# Patient Record
Sex: MALE | Race: AMERICAN INDIAN OR ALASKA NATIVE
[De-identification: names, ages, dates, MRNs, and addresses within clinical notes are randomized per-mention and may not be internally consistent; named-entity substitution may affect disease eponyms.]

---

## 2017-11-28 ENCOUNTER — HOSPITAL ENCOUNTER (EMERGENCY)
Dept: HOSPITAL 5 - ED | Age: 22
Discharge: HOME | End: 2017-11-28
Payer: SELF-PAY

## 2017-11-28 VITALS — SYSTOLIC BLOOD PRESSURE: 119 MMHG | DIASTOLIC BLOOD PRESSURE: 68 MMHG

## 2017-11-28 DIAGNOSIS — N30.01: Primary | ICD-10-CM

## 2017-11-28 LAB
BACTERIA #/AREA URNS HPF: (no result) /HPF
BILIRUB UR QL STRIP: (no result)
BLOOD UR QL VISUAL: (no result)
KETONES UR STRIP-MCNC: (no result) MG/DL
LEUKOCYTE ESTERASE UR QL STRIP: (no result)
MUCOUS THREADS #/AREA URNS HPF: (no result) /HPF
NITRITE UR QL STRIP: (no result)
PH UR STRIP: 5 [PH] (ref 5–7)
RBC #/AREA URNS HPF: 23 /HPF (ref 0–6)
UROBILINOGEN UR-MCNC: < 2 MG/DL (ref ?–2)
WBC #/AREA URNS HPF: > 182 /HPF (ref 0–6)

## 2017-11-28 PROCEDURE — 99283 EMERGENCY DEPT VISIT LOW MDM: CPT

## 2017-11-28 PROCEDURE — 81001 URINALYSIS AUTO W/SCOPE: CPT

## 2017-11-28 PROCEDURE — 87086 URINE CULTURE/COLONY COUNT: CPT

## 2017-11-28 PROCEDURE — 87591 N.GONORRHOEAE DNA AMP PROB: CPT

## 2017-11-28 PROCEDURE — 96372 THER/PROPH/DIAG INJ SC/IM: CPT

## 2017-11-28 NOTE — EMERGENCY DEPARTMENT REPORT
ED Male  HPI





- General


Chief complaint: Urogenital-Male


Stated complaint: PENILE DISCHARGE


Time Seen by Provider: 17 12:27


Source: patient


Mode of arrival: Ambulatory


Limitations: No Limitations





- History of Present Illness


Initial comments: 





Patient here complaining in with urinary burning 2 weeks.  He said he also 

have discharge from penis and would like to be checked for STDs.  Denies any 

abdominal or back pain.  Denies any nausea or vomiting.  Denies any fever or 

chills.  No over-the-counter medication taken.


MD Complaint: penile discharge, dysuria


Onset/Timin


-: week(s)


Radiation: none


Severity: mild


Severity scale (0 -10): 2 (burning with urination)


Quality: burning


Consistency: intermittent


Worsens with: urination


new sexual partner


discharge, dysuria.  denies: swelling, mass, rash, urinary retention, blood in 

urine, fever, nausea/vomiting, incontinence





- Related Data


Sexually active: Yes


 Previous Rx's











 Medication  Instructions  Recorded  Last Taken  Type


 


Doxycycline [Vibramycin] 100 mg PO Q12HR #28 capsule 10/20/17 Unknown Rx


 


Ibuprofen [Motrin] 600 mg PO Q8H PRN #30 tablet 10/20/17 Unknown Rx


 


Ondansetron [Zofran Odt] 4 mg PO Q8HR PRN #20 tab.rapdis 10/20/17 Unknown Rx


 


Ciprofloxacin HCl [Ciprofloxacin 500 mg PO Q12H 7 Days #14 tab 17 Unknown 

Rx





TAB]    











 Allergies











Allergy/AdvReac Type Severity Reaction Status Date / Time


 


No Known Allergies Allergy   Verified 17 10:02














ED Review of Systems


ROS: 


Stated complaint: PENILE DISCHARGE


Other details as noted in HPI





Comment: All other systems reviewed and negative


Constitutional: no symptoms reported


Eyes: denies: eye discharge


ENT: denies: ear pain, throat pain, congestion


Respiratory: no symptoms reported


Cardiovascular: denies: chest pain, palpitations, dyspnea on exertion, edema, 

syncope, paroxysmal nocturnal dyspnea


Gastrointestinal: denies: abdominal pain, nausea, vomiting, diarrhea, 

constipation


Genitourinary: dysuria, discharge.  denies: urgency, frequency, hematuria, 

testicular pain, testicular mass


Musculoskeletal: denies: back pain, joint swelling, arthralgia, myalgia


Skin: denies: rash


Neurological: denies: headache, weakness





ED Past Medical Hx





- Past Medical History


Previous Medical History?: No





- Surgical History


Past Surgical History?: No





- Family History


Family history: no significant





- Social History


Smoking Status: Never Smoker


Substance Use Type: None





- Medications


Home Medications: 


 Home Medications











 Medication  Instructions  Recorded  Confirmed  Last Taken  Type


 


Doxycycline [Vibramycin] 100 mg PO Q12HR #28 capsule 10/20/17  Unknown Rx


 


Ibuprofen [Motrin] 600 mg PO Q8H PRN #30 tablet 10/20/17  Unknown Rx


 


Ondansetron [Zofran Odt] 4 mg PO Q8HR PRN #20 tab.rapdis 10/20/17  Unknown Rx


 


Ciprofloxacin HCl [Ciprofloxacin 500 mg PO Q12H 7 Days #14 tab 17  

Unknown Rx





TAB]     














ED Physical Exam





- General


Limitations: No Limitations


General appearance: alert, in no apparent distress





- Head


Head exam: Present: atraumatic, normocephalic, normal inspection





- Eye


Eye exam: Present: normal appearance, PERRL, EOMI


Pupils: Present: normal accommodation





- ENT


ENT exam: Present: normal exam, normal orophraynx, mucous membranes moist, TM's 

normal bilaterally, normal external ear exam





- Neck


Neck exam: Present: normal inspection, full ROM.  Absent: tenderness, 

meningismus, lymphadenopathy





- Respiratory


Respiratory exam: Present: normal lung sounds bilaterally.  Absent: respiratory 

distress, chest wall tenderness, accessory muscle use





- Cardiovascular


Cardiovascular Exam: Present: regular rate, normal rhythm, normal heart sounds.

  Absent: systolic murmur, diastolic murmur





- GI/Abdominal


GI/Abdominal exam: Present: soft, normal bowel sounds.  Absent: distended, 

tenderness, guarding, rebound, rigid





- Extremities Exam


Extremities exam: Present: normal inspection, full ROM, normal capillary refill

, other (no clubbing, cyanosis or edema.  +2 pulses all extremities.  No 

neurovascular compromise).  Absent: tenderness, pedal edema, joint swelling, 

calf tenderness





- Back Exam


Back exam: Present: normal inspection, full ROM.  Absent: tenderness, CVA 

tenderness (R), CVA tenderness (L), muscle spasm, paraspinal tenderness, 

vertebral tenderness, rash noted





- Neurological Exam


Neurological exam: Present: alert, oriented X3, normal gait





- Psychiatric


Psychiatric exam: Present: normal affect, normal mood





- Skin


Skin exam: Present: warm, dry, intact, normal color.  Absent: rash





ED Course


 Vital Signs











  17





  10:02


 


Temperature 98.4 F


 


Pulse Rate 66


 


Respiratory 18





Rate 


 


Blood Pressure 119/68


 


O2 Sat by Pulse 100





Oximetry 














- Reevaluation(s)


Reevaluation #1: 





17 05:12


Patient had uneventful ED stay.  A shin treated with Rocephin 1 g IM for UTI 

and empirically for gonorrhea, Flagyl 2 g for Trichomonas and azithromycin 1 g 

for Chlamydia.  He had no adverse reaction from medications

















ED Medical Decision Making





- Lab Data








 Lab Results











  17 Range/Units





  12:30 


 


Urine Color  Yellow  (Yellow)  


 


Urine Turbidity  Clear  (Clear)  


 


Urine pH  5.0  (5.0-7.0)  


 


Ur Specific Gravity  1.028  (1.003-1.030)  


 


Urine Protein  30 mg/dl  (Negative)  mg/dL


 


Urine Glucose (UA)  Neg  (Negative)  mg/dL


 


Urine Ketones  Tr  (Negative)  mg/dL


 


Urine Blood  Sm  (Negative)  


 


Urine Nitrite  Neg  (Negative)  


 


Urine Bilirubin  Neg  (Negative)  


 


Urine Urobilinogen  < 2.0  (<2.0)  mg/dL


 


Ur Leukocyte Esterase  Lg  (Negative)  


 


Urine WBC (Auto)  > 182.0 H  (0.0-6.0)  /HPF


 


Urine RBC (Auto)  23.0  (0.0-6.0)  /HPF


 


Urine Bacteria (Auto)  1+  (Negative)  /HPF


 


Urine Mucus  1+  /HPF








Urine culture sent and pending





- Medical Decision Making





ED course: Patient here complaining a urinary burning and penile discharge for 

2 weeks.  He reports that he was exposed to partner that told him that she was 

treated for STD.  Patient requested to be treated.  UA revealed patient also 

with urinary tract infection to include greater than 182 white blood cell, 

large amount of leukocyte esterase, positive bacteria, positive protein and 

small amount of blood.  Patient was treated with Rocephin 1 g IM to cover UTI 

and gonorrhea, Flagyl 2 g by mouth to cover Trichomonas and azithromycin 1 g by 

mouth to cover chlamydia.  He had no adverse reaction from medication.  

Gonorrhea and chlamydia tests are pending and urine cultures pending.  Patient 

stable throughout ED course .I discussed with him that he needs to follow-up 

with clinic on health department follow-up visit STD.  Patient also discharged 

home with prescription for ciprofloxacin for UTI and was given instruction to 

also follow up at primary care physician.


Critical care attestation.: 


If time is entered above; I have spent that time in minutes in the direct care 

of this critically ill patient, excluding procedure time.








ED Disposition


Clinical Impression: 


 Concern about STD in male without diagnosis, Penile discharge, Acute cystitis 

with hematuria





Disposition: DC-01 TO HOME OR SELFCARE


Is pt being admited?: No


Does the pt Need Aspirin: No


Condition: Stable


Instructions:  Sexually Transmitted Diseases (ED), Safe Sex (ED), Urinary Tract 

Infection in Men (ED), Dysuria (ED)


Additional Instructions: 


Please refrain from having sexual activity for the next 2 weeks.


Follow-up with your primary care physician in one to 10 days


Follow up with [facility] in 7-10 days for repeat STD 

check


 You were treated today in emergency room for gonorrhea, chlamydia and 

Trichomonias.  The urine was sent for culture, gonorrhea and chlamydia and 

although he retreated results will be back in 5-7 days.  Please return to 

medical records for year identification to get results.


Practice safe sex


do not drink alcohol for the next 7 days as this will interact negatively with 

medication given for STD.


Tell  partner that you're treated for STD and hospital today.


 increase  fluid intake


Take medication as prescribed for urinary tract infection


Prescriptions: 


Ciprofloxacin HCl [Ciprofloxacin TAB] 500 mg PO Q12H 7 Days #14 tab


Referrals: 


[facility] [Outside] - 7-10 days


PRIMARY CARE,MD [Primary Care Provider] - 2-3 Days


Forms:  Work/School Release Form(ED)

## 2018-04-04 ENCOUNTER — HOSPITAL ENCOUNTER (EMERGENCY)
Dept: HOSPITAL 5 - ED | Age: 23
Discharge: HOME | End: 2018-04-04
Payer: SELF-PAY

## 2018-04-04 VITALS — DIASTOLIC BLOOD PRESSURE: 78 MMHG | SYSTOLIC BLOOD PRESSURE: 128 MMHG
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
## 2018-04-04 DIAGNOSIS — N39.0: ICD-10-CM
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
## 2018-04-04 DIAGNOSIS — S39.012A: Primary | ICD-10-CM

## 2018-04-04 DIAGNOSIS — J03.90: ICD-10-CM
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
## 2018-04-04 LAB
BILIRUB UR QL STRIP: (no result)
BLOOD UR QL VISUAL: (no result)
MUCOUS THREADS #/AREA URNS HPF: (no result) /HPF
PH UR STRIP: 5 [PH] (ref 5–7)
RBC #/AREA URNS HPF: 8 /HPF (ref 0–6)
UROBILINOGEN UR-MCNC: < 2 MG/DL (ref ?–2)
WBC #/AREA URNS HPF: 29 /HPF (ref 0–6)

## 2018-04-04 PROCEDURE — 99283 EMERGENCY DEPT VISIT LOW MDM: CPT

## 2018-04-04 PROCEDURE — 81001 URINALYSIS AUTO W/SCOPE: CPT

## 2018-04-04 NOTE — EMERGENCY DEPARTMENT REPORT
ED ENT HPI





- General


Chief complaint: Back Pain/Injury


Stated complaint: BACK PAIN/SORE THROAT


Time Seen by Provider: 04/04/18 16:54


Source: patient


Mode of arrival: Ambulatory


Limitations: No Limitations





- History of Present Illness


Initial comments: 





This is a 22-year-old male nontoxic, well nourished in appearance, no acute 

signs of distress presents to the ED with c/o of sore throat, dysuria, and 

acute on chronic back pain 4 years.  Patient describes sore throat as 

swallowing razer blades.  Patient denies any penile discharge, fever, chills, 

nausea, vomiting, headache, stiff neck, chest pain, short of breath, numbness 

or tingling.  Patient denies any bladder or bowel.  Patient stated that her 

pain is intermittent in and is relieved with supine position and actually by 

movement.  Patient denies any allergies or significant past medical history.


MD complaint: sore throat, other (back pain, dysuria)


-: days(s) (3)


Location: throat


Severity: mild


Severity scale (0 -10): 3


Quality: burning, aching


Consistency: intermittent


Improves with: none


Worsens with: swallowing


Associated Symptoms: pain with swallowing, sore throat.  denies: fever, cough, 

gum swelling, toothache, tinnitus, hearing loss, discharge from ear, rhinorrhea





- Related Data


 Previous Rx's











 Medication  Instructions  Recorded  Last Taken  Type


 


Doxycycline [Vibramycin] 100 mg PO Q12HR #28 capsule 10/20/17 Unknown Rx


 


Ibuprofen [Motrin] 600 mg PO Q8H PRN #30 tablet 10/20/17 Unknown Rx


 


Ondansetron [Zofran Odt] 4 mg PO Q8HR PRN #20 tab.rapdis 10/20/17 Unknown Rx


 


Ciprofloxacin HCl [Ciprofloxacin 500 mg PO Q12H 7 Days #14 tab 11/28/17 Unknown 

Rx





TAB]    


 


Amoxicillin/K Clav Tab [Augmentin 1 tab PO Q12HR #20 tab 04/04/18 Unknown Rx





875 mg]    


 


Cyclobenzaprine [Flexeril] 10 mg PO QHS PRN #7 tablet 04/04/18 Unknown Rx


 


Ibuprofen [Motrin] 600 mg PO Q8H PRN #30 tablet 04/04/18 Unknown Rx











 Allergies











Allergy/AdvReac Type Severity Reaction Status Date / Time


 


No Known Allergies Allergy   Verified 04/04/18 13:05














ED Dental HPI





- General


Chief complaint: Back Pain/Injury


Stated complaint: BACK PAIN/SORE THROAT


Time Seen by Provider: 04/04/18 16:54


Source: patient


Mode of arrival: Ambulatory


Limitations: No Limitations





- Related Data


 Previous Rx's











 Medication  Instructions  Recorded  Last Taken  Type


 


Doxycycline [Vibramycin] 100 mg PO Q12HR #28 capsule 10/20/17 Unknown Rx


 


Ibuprofen [Motrin] 600 mg PO Q8H PRN #30 tablet 10/20/17 Unknown Rx


 


Ondansetron [Zofran Odt] 4 mg PO Q8HR PRN #20 tab.rapdis 10/20/17 Unknown Rx


 


Ciprofloxacin HCl [Ciprofloxacin 500 mg PO Q12H 7 Days #14 tab 11/28/17 Unknown 

Rx





TAB]    


 


Amoxicillin/K Clav Tab [Augmentin 1 tab PO Q12HR #20 tab 04/04/18 Unknown Rx





875 mg]    


 


Cyclobenzaprine [Flexeril] 10 mg PO QHS PRN #7 tablet 04/04/18 Unknown Rx


 


Ibuprofen [Motrin] 600 mg PO Q8H PRN #30 tablet 04/04/18 Unknown Rx











 Allergies











Allergy/AdvReac Type Severity Reaction Status Date / Time


 


No Known Allergies Allergy   Verified 04/04/18 13:05














ED Review of Systems


ROS: 


Stated complaint: BACK PAIN/SORE THROAT


Other details as noted in HPI





Constitutional: denies: chills, fever


Eyes: denies: eye pain, eye discharge, vision change


ENT: throat pain.  denies: ear pain


Respiratory: denies: cough, shortness of breath, wheezing


Cardiovascular: denies: chest pain, palpitations


Endocrine: no symptoms reported


Gastrointestinal: denies: abdominal pain, nausea, diarrhea


Genitourinary: dysuria.  denies: urgency


Musculoskeletal: back pain.  denies: joint swelling, arthralgia


Skin: denies: rash, lesions


Neurological: denies: headache, weakness, paresthesias


Psychiatric: denies: anxiety, depression


Hematological/Lymphatic: denies: easy bleeding, easy bruising





ED Past Medical Hx





- Past Medical History


Previous Medical History?: No





- Surgical History


Past Surgical History?: No





- Social History


Smoking Status: Never Smoker


Substance Use Type: None





- Medications


Home Medications: 


 Home Medications











 Medication  Instructions  Recorded  Confirmed  Last Taken  Type


 


Doxycycline [Vibramycin] 100 mg PO Q12HR #28 capsule 10/20/17  Unknown Rx


 


Ibuprofen [Motrin] 600 mg PO Q8H PRN #30 tablet 10/20/17  Unknown Rx


 


Ondansetron [Zofran Odt] 4 mg PO Q8HR PRN #20 tab.rapdis 10/20/17  Unknown Rx


 


Ciprofloxacin HCl [Ciprofloxacin 500 mg PO Q12H 7 Days #14 tab 11/28/17  

Unknown Rx





TAB]     


 


Amoxicillin/K Clav Tab [Augmentin 1 tab PO Q12HR #20 tab 04/04/18  Unknown Rx





875 mg]     


 


Cyclobenzaprine [Flexeril] 10 mg PO QHS PRN #7 tablet 04/04/18  Unknown Rx


 


Ibuprofen [Motrin] 600 mg PO Q8H PRN #30 tablet 04/04/18  Unknown Rx














ED Physical Exam





- General


Limitations: No Limitations


General appearance: alert, in no apparent distress





- Head


Head exam: Present: atraumatic, normocephalic





- Eye


Eye exam: Present: normal appearance


Pupils: Present: normal accommodation





- ENT


ENT exam: Present: mucous membranes moist, TM's normal bilaterally, normal 

external ear exam





- Expanded ENT Exam


  ** Expanded


Ear exam: Present: normal external inspection


Mouth exam: Present: normal external inspection, tongue normal.  Absent: 

drooling, trismus, muffled voice, tongue elevation, laceration


Teeth exam: Present: normal inspection


Throat exam: Positive: tonsillar erythema, tonsillomegaly (2+), tonsillar 

exudate, other (Uvula midline. No abscess or swelling noted. ).  Negative: R 

peritonsillar mass, L peritonsillar mass





- Neck


Neck exam: Present: normal inspection, full ROM.  Absent: tenderness, 

meningismus





- Respiratory


Respiratory exam: Present: normal lung sounds bilaterally.  Absent: respiratory 

distress, wheezes, rales, rhonchi, stridor, chest wall tenderness, accessory 

muscle use, decreased breath sounds, prolonged expiratory





- Cardiovascular


Cardiovascular Exam: Present: regular rate, normal rhythm, normal heart sounds.

  Absent: bradycardia, tachycardia, irregular rhythm, systolic murmur, 

diastolic murmur, rubs, gallop





- GI/Abdominal


GI/Abdominal exam: Present: soft, normal bowel sounds.  Absent: distended, 

tenderness, guarding, rebound, rigid, diminished bowel sounds





- Rectal


Rectal exam: Present: deferred





- Extremities Exam


Extremities exam: Present: normal inspection, full ROM, normal capillary 

refill.  Absent: tenderness





- Back Exam


Back exam: Present: normal inspection, full ROM, paraspinal tenderness (lumbar 

region).  Absent: tenderness, CVA tenderness (R), CVA tenderness (L), muscle 

spasm, vertebral tenderness, rash noted





- Expanded Back Exam


  ** Expanded


Back exam: Absent: saddle anesthesia


Back exam: Negative Straight Leg Raising: Left, Right





- Neurological Exam


Neurological exam: Present: alert, oriented X3, normal gait





- Psychiatric


Psychiatric exam: Present: normal affect, normal mood





- Skin


Skin exam: Present: warm, dry, intact, normal color.  Absent: rash





ED Course


 Vital Signs











  04/04/18





  13:06


 


Temperature 98.2 F


 


Pulse Rate 71


 


Blood Pressure 123/71


 


O2 Sat by Pulse 99





Oximetry 














- Reevaluation(s)


Reevaluation #1: 





04/04/18 17:51


Patient is speaking in full sentences with no signs of distress noted.





ED Medical Decision Making





- Medical Decision Making





This is a 22-year-old male that presents with UTI, low back strain, and 

tonsillitis.  Patient is stable and was examined by me.  UA indicates UTI. 

Patient is to be treated with Augmentin for UTI and Tonsillitis. Patient also 

received Motrin in the ED which it stated symptoms of back pain resolved and 

improved. Patient is discharged with Flexeril and motrin and was instructed not 

to operate any machinry while taking Fleeril due to drowiens.  Patient was 

instructed to Follow-up with a primary care doctor in 3-5 days or if symptoms 

worsen and continue return to emergency room as soon as possible.  At time of 

discharge, the patient does not seem toxic or ill in appearance.  No acute 

signs of distress noted.  Patient agrees to discharge treatment plan of care.  

No further questions noted by the patient.


Critical care attestation.: 


If time is entered above; I have spent that time in minutes in the direct care 

of this critically ill patient, excluding procedure time.








ED Disposition


Clinical Impression: 


 Tonsillitis





Low back strain


Qualifiers:


 Encounter type: initial encounter Qualified Code(s): S39.012A - Strain of 

muscle, fascia and tendon of lower back, initial encounter





UTI (urinary tract infection)


Qualifiers:


 Urinary tract infection type: site unspecified Hematuria presence: without 

hematuria Qualified Code(s): N39.0 - Urinary tract infection, site not specified





Disposition: DC-01 TO HOME OR SELFCARE


Is pt being admited?: No


Does the pt Need Aspirin: No


Condition: Stable


Instructions:  Low Back Strain (ED), Cyclobenzaprine (By mouth), Ibuprofen (By 

mouth), Tonsillitis (ED), Amoxicillin/Clavulanate Potassium (By mouth), Urinary 

Tract Infection in Men (ED)


Additional Instructions: 


Follow-up with a primary care doctor in 3-5 days or if symptoms worsen and 

continue return to emergency room as soon as possible. 


Take ibuprofen and Flexeril as prescribed.  Do not operate heavy machinery 

while taking Flexeril due to sedation


Prescriptions: 


Cyclobenzaprine [Flexeril] 10 mg PO QHS PRN #7 tablet


 PRN Reason: Muscle Spasm


Amoxicillin/K Clav Tab [Augmentin 875 mg] 1 tab PO Q12HR #20 tab


Ibuprofen [Motrin] 600 mg PO Q8H PRN #30 tablet


 PRN Reason: Pain


Referrals: 


PRIMARY CARE,MD [Primary Care Provider] - 3-5 Days


ISABEL DOCKERY MD [Staff Physician] - 3-5 Days


[facility] [Outside] - 3-5 Days


[facility] [Outside] - 3-5 Days


Forms:  Work/School Release Form(ED)

## 2018-04-18 ENCOUNTER — HOSPITAL ENCOUNTER (EMERGENCY)
Dept: HOSPITAL 5 - ED | Age: 23
LOS: 1 days | Discharge: PSYCHIATRIC OR SUBSTANCE USE FACILITY | End: 2018-04-19
Payer: SELF-PAY

## 2018-04-18 DIAGNOSIS — F17.200: ICD-10-CM

## 2018-04-18 DIAGNOSIS — F32.9: Primary | ICD-10-CM

## 2018-04-18 DIAGNOSIS — F19.10: ICD-10-CM

## 2018-04-18 DIAGNOSIS — F12.10: ICD-10-CM

## 2018-04-18 LAB
BACTERIA #/AREA URNS HPF: (no result) /HPF
BASOPHILS # (AUTO): 0 K/MM3 (ref 0–0.1)
BASOPHILS NFR BLD AUTO: 0.5 % (ref 0–1.8)
BENZODIAZEPINES SCREEN,URINE: (no result)
BILIRUB UR QL STRIP: (no result)
BLOOD UR QL VISUAL: (no result)
BUN SERPL-MCNC: 9 MG/DL (ref 9–20)
BUN/CREAT SERPL: 15 %
CALCIUM SERPL-MCNC: 9.6 MG/DL (ref 8.4–10.2)
EOSINOPHIL # BLD AUTO: 0 K/MM3 (ref 0–0.4)
EOSINOPHIL NFR BLD AUTO: 0.2 % (ref 0–4.3)
HCT VFR BLD CALC: 41.3 % (ref 35.5–45.6)
HEMOLYSIS INDEX: 2
HGB BLD-MCNC: 14.2 GM/DL (ref 11.8–15.2)
LYMPHOCYTES # BLD AUTO: 1.5 K/MM3 (ref 1.2–5.4)
LYMPHOCYTES NFR BLD AUTO: 25.3 % (ref 13.4–35)
MCH RBC QN AUTO: 29 PG (ref 28–32)
MCHC RBC AUTO-ENTMCNC: 34 % (ref 32–34)
MCV RBC AUTO: 83 FL (ref 84–94)
METHADONE SCREEN,URINE: (no result)
MONOCYTES # (AUTO): 0.4 K/MM3 (ref 0–0.8)
MONOCYTES % (AUTO): 6.6 % (ref 0–7.3)
MUCOUS THREADS #/AREA URNS HPF: (no result) /HPF
OPIATE SCREEN,URINE: (no result)
PH UR STRIP: 5 [PH] (ref 5–7)
PLATELET # BLD: 329 K/MM3 (ref 140–440)
RBC # BLD AUTO: 4.97 M/MM3 (ref 3.65–5.03)
RBC #/AREA URNS HPF: 12 /HPF (ref 0–6)
UROBILINOGEN UR-MCNC: 2 MG/DL (ref ?–2)
WBC #/AREA URNS HPF: 72 /HPF (ref 0–6)

## 2018-04-18 PROCEDURE — 80048 BASIC METABOLIC PNL TOTAL CA: CPT

## 2018-04-18 PROCEDURE — 80307 DRUG TEST PRSMV CHEM ANLYZR: CPT

## 2018-04-18 PROCEDURE — 85025 COMPLETE CBC W/AUTO DIFF WBC: CPT

## 2018-04-18 PROCEDURE — 36415 COLL VENOUS BLD VENIPUNCTURE: CPT

## 2018-04-18 PROCEDURE — 80320 DRUG SCREEN QUANTALCOHOLS: CPT

## 2018-04-18 PROCEDURE — 99285 EMERGENCY DEPT VISIT HI MDM: CPT

## 2018-04-18 PROCEDURE — 81001 URINALYSIS AUTO W/SCOPE: CPT

## 2018-04-18 PROCEDURE — G0480 DRUG TEST DEF 1-7 CLASSES: HCPCS

## 2018-04-19 VITALS — SYSTOLIC BLOOD PRESSURE: 107 MMHG | DIASTOLIC BLOOD PRESSURE: 44 MMHG
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
## 2018-04-19 NOTE — CONSULTATION
History of Present Illness





- Reason for Consult


Consult date: 04/19/18


Reason for consult: Mental Health Evaluation


Requesting physician: LATHA WELCH





- Chief Complaint


Chief complaint: 


"I have to do better"








- History of Present Psychiatric Illness


23 y/o AA male presenting to [facility] for SI/HI's. Today the patient is calm and 

cooperative during the assessment. He stated having a lot of life stressors 

currently (financial issues, co-parenting, and no vehicle). He stated feeling 

hopeless and helpless about his issues. He think no one cares about him, but 

did admit that his actions recently have not been rationale. He stated that he 

is having problems with his children's mother. He denies wanting to kill anyone 

when asked. He stated that he want to attend [employer] and get his life 

together because he cannot continue to live this way. He acknowledged suicidal 

thoughts recently. He denies SI/HI's and AVH's. He admitted to erratic sleep 

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 He acknowledged marijuana use socially, 

but denies alcohol consumption (etoh). The patient prefer to talk with a 

therapist instead of taking medication.   








Medications and Allergies


 Allergies











Allergy/AdvReac Type Severity Reaction Status Date / Time


 


No Known Allergies Allergy   Verified 04/04/18 13:05











 Home Medications











 Medication  Instructions  Recorded  Confirmed  Last Taken  Type


 


Doxycycline [Vibramycin] 100 mg PO Q12HR #28 capsule 10/20/17 04/18/18 Unknown 

Rx


 


Ibuprofen [Motrin] 600 mg PO Q8H PRN #30 tablet 10/20/17 04/18/18 Unknown Rx


 


Ondansetron [Zofran Odt] 4 mg PO Q8HR PRN #20 tab.rapdis 10/20/17 04/18/18 

Unknown Rx


 


Ciprofloxacin HCl [Ciprofloxacin 500 mg PO Q12H 7 Days #14 tab 11/28/17 04/18/ 18 Unknown Rx





TAB]     


 


Amoxicillin/K Clav Tab [Augmentin 1 tab PO Q12HR #20 tab 04/04/18 04/18/18 

Unknown Rx





875 mg]     


 


Cyclobenzaprine [Flexeril] 10 mg PO QHS PRN #7 tablet 04/04/18 04/18/18 Unknown 

Rx


 


Ibuprofen [Motrin] 600 mg PO Q8H PRN #30 tablet 04/04/18 04/18/18 Unknown Rx














Mental Status Exam





- Vital signs


 Last Vital Signs











Temp  98.1 F   04/19/18 09:48


 


Pulse  81   04/19/18 09:48


 


Resp  19   04/19/18 09:49


 


BP  115/69   04/19/18 09:48


 


Pulse Ox  100   04/19/18 09:49














- Exam


Narrative exam: 


MSE:





 Appearance: calm, cooperative


 Behavior: regular eye contact 


 Speech: regular rate and tone


 Mood: "okay" withdrawn


 Affect: congruent to mood


 Thought Process: circumstantial  


 Thought Content: denies SI/HI's and AVH's


 Motor Activity: ambulatory


 Cognition: A/O x 3


 Insight: variable 


 Judgment: variable 








Results


Result Diagrams: 


 04/18/18 17:22





 04/18/18 17:22


 Abnormal lab results











  04/18/18 04/18/18 04/18/18 Range/Units





  17:16 17:22 17:22 


 


MCV     (84-94)  fl


 


Creatinine     (0.8-1.5)  mg/dL


 


Glucose     ()  mg/dL


 


Urine WBC (Auto)  72.0 H    (0.0-6.0)  /HPF


 


Salicylates   < 0.3 L   (2.8-20.0)  mg/dL


 


Acetaminophen    < 5.0 L  (10.0-30.0)  ug/mL














  04/18/18 04/18/18 Range/Units





  17:22 17:22 


 


MCV   83 L  (84-94)  fl


 


Creatinine  0.6 L   (0.8-1.5)  mg/dL


 


Glucose  104 H   ()  mg/dL


 


Urine WBC (Auto)    (0.0-6.0)  /HPF


 


Salicylates    (2.8-20.0)  mg/dL


 


Acetaminophen    (10.0-30.0)  ug/mL








All other labs normal.








Assessment and Plan


Assessment and plan: 


Impression: MDD, Severe Type. Today the patient is calm and cooperative during 

the assessment.





DDx: R/O Bipolar DO





Recommendation/Plan: Continue 1013 and gather collateral information to 

determine proper dispo. Discussed risk/benefits of antidepressants with 

patient. The patient prefer therapy at this time. Discussed generalized coping 

skills with patient.

## 2021-06-09 ENCOUNTER — HOSPITAL ENCOUNTER (EMERGENCY)
Dept: HOSPITAL 5 - ED | Age: 26
Discharge: HOME | End: 2021-06-09
Payer: SELF-PAY

## 2021-06-09 VITALS — SYSTOLIC BLOOD PRESSURE: 120 MMHG | DIASTOLIC BLOOD PRESSURE: 71 MMHG
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
## 2021-06-09 DIAGNOSIS — S01.01XD: Primary | ICD-10-CM

## 2021-06-09 DIAGNOSIS — Z79.899: ICD-10-CM

## 2021-06-09 DIAGNOSIS — F32.9: ICD-10-CM

## 2021-06-09 NOTE — EMERGENCY DEPARTMENT REPORT
ED General Adult HPI





- General


Chief complaint: Laceration/Recheck/Suture


Stated complaint: SUTURE REMOVAL


Time Seen by Provider: 06/09/21 12:33


Source: patient


Mode of arrival: Ambulatory


Limitations: No Limitations





- History of Present Illness


Initial comments: 





25-year-old -American male patient presents for suture removal today.  

Patient states he had the sutures placed at [facility] 8 days ago. 

He denies any pain, swelling, drainage, redness, or fever/chills/sweats.  

Patient states there are 6 sutures in place.


-: Sudden





- Related Data


                                  Previous Rx's











 Medication  Instructions  Recorded  Last Taken  Type


 


DOXYCYCLINE Hyclate [Vibramycin] 100 mg PO Q12HR #28 capsule 10/20/17 Unknown Rx


 


Ibuprofen [Motrin] 600 mg PO Q8H PRN #30 tablet 10/20/17 Unknown Rx


 


Ondansetron [Zofran Odt] 4 mg PO Q8HR PRN #20 tab.rapdis 10/20/17 Unknown Rx


 


Ciprofloxacin HCl [Ciprofloxacin 500 mg PO Q12H 7 Days #14 tab 11/28/17 Unknown 

Rx





TAB]    


 


Amoxicillin/K Clav Tab [Augmentin 1 tab PO Q12HR #20 tab 04/04/18 Unknown Rx





875 mg]    


 


Cyclobenzaprine [Flexeril] 10 mg PO QHS PRN #7 tablet 04/04/18 Unknown Rx


 


Ibuprofen [Motrin] 600 mg PO Q8H PRN #30 tablet 04/04/18 Unknown Rx











                                    Allergies











Allergy/AdvReac Type Severity Reaction Status Date / Time


 


No Known Allergies Allergy   Verified 04/04/18 13:05














ED Review of Systems


ROS: 


Stated complaint: SUTURE REMOVAL


Other details as noted in HPI





Constitutional: denies: fever, malaise


Skin: denies: rash, lesions, change in color





ED Past Medical Hx





- Past Medical History


Hx Psychiatric Treatment: Yes (depression, polysubstance abuse)





- Surgical History


Past Surgical History?: No





- Social History


Smoking Status: Never Smoker





- Medications


Home Medications: 


                                Home Medications











 Medication  Instructions  Recorded  Confirmed  Last Taken  Type


 


DOXYCYCLINE Hyclate [Vibramycin] 100 mg PO Q12HR #28 capsule 10/20/17 04/18/18 

Unknown Rx


 


Ibuprofen [Motrin] 600 mg PO Q8H PRN #30 tablet 10/20/17 04/18/18 Unknown Rx


 


Ondansetron [Zofran Odt] 4 mg PO Q8HR PRN #20 tab.rapdis 10/20/17 04/18/18 

Unknown Rx


 


Ciprofloxacin HCl [Ciprofloxacin 500 mg PO Q12H 7 Days #14 tab 11/28/17 04/18/18

 Unknown Rx





TAB]     


 


Amoxicillin/K Clav Tab [Augmentin 1 tab PO Q12HR #20 tab 04/04/18 04/18/18 

Unknown Rx





875 mg]     


 


Cyclobenzaprine [Flexeril] 10 mg PO QHS PRN #7 tablet 04/04/18 04/18/18 Unknown 

Rx


 


Ibuprofen [Motrin] 600 mg PO Q8H PRN #30 tablet 04/04/18 04/18/18 Unknown Rx














ED Physical Exam





- General


Limitations: No Limitations


General appearance: alert, in no apparent distress





- Head


Head exam: Present: normocephalic, other (Approximately 3 to 4 cm laceration 

noted to)





- Respiratory


Respiratory exam: Absent: respiratory distress





- Cardiovascular


Cardiovascular Exam: Present: regular rate, normal rhythm





- Neurological Exam


Neurological exam: Present: alert, oriented X3, normal gait





- Psychiatric


Psychiatric exam: Present: normal affect, normal mood





- Skin


Skin exam: Present: warm, dry, intact, normal color.  Absent: rash





ED Course


                                   Vital Signs











  06/09/21





  12:27


 


Temperature 98.3 F


 


Pulse Rate 54 L


 


Respiratory 18





Rate 


 


Blood Pressure 120/71


 


O2 Sat by Pulse 100





Oximetry 














- Procedure Description


Procedures done: 6 simple sutures removed face.  Patient tolerated procedure 

well without any immediate complications.  No bleeding, wound dehiscence, or 

erythema noted





ED Medical Decision Making





- Medical Decision Making





Sutures removed without any immediate complications.  Patient tolerated 

procedure well.  Discussed continued wound care and signs and symptoms that 

should prompt immediate return to the emergency department in detail with 

patient verbalized understanding.  Patient is well-appearing and stable for [facility].  He is to follow-up with his primary care provider.


Critical care attestation.: 


If time is entered above; I have spent that time in minutes in the direct care 

of this critically ill patient, excluding procedure time.








ED Disposition


Clinical Impression: 


 Visit for suture removal





Disposition: DC-01 TO HOME OR SELFCARE


Is pt being admited?: No


Condition: Stable


Instructions:  Wound Closure Removal, Care After


Additional Instructions: 


Neosporin


Moderna 


Referrals: 


[facility] [Provider Group] - 3-5 Days

## 2021-07-28 ENCOUNTER — HOSPITAL ENCOUNTER (EMERGENCY)
Dept: HOSPITAL 5 - ED | Age: 26
Discharge: HOME | End: 2021-07-28
Payer: SELF-PAY

## 2021-07-28 VITALS — DIASTOLIC BLOOD PRESSURE: 72 MMHG | SYSTOLIC BLOOD PRESSURE: 120 MMHG

## 2021-07-28 DIAGNOSIS — Z79.899: ICD-10-CM

## 2021-07-28 DIAGNOSIS — J02.9: Primary | ICD-10-CM

## 2021-07-28 DIAGNOSIS — F32.9: ICD-10-CM

## 2021-07-28 PROCEDURE — 87430 STREP A AG IA: CPT

## 2021-07-28 PROCEDURE — 87116 MYCOBACTERIA CULTURE: CPT

## 2021-07-28 NOTE — EMERGENCY DEPARTMENT REPORT
ED ENT HPI





- General


Chief complaint: Dental/Oral


Stated complaint: SORE THROAT


Time Seen by Provider: 07/28/21 11:12


Source: patient


Mode of arrival: Ambulatory


Limitations: No Limitations





- History of Present Illness


Initial comments: 





Patient is a 25-year-old male presents emergency room complaints of a sore 

throat that began approximately 6 days ago. He states he has some discomfort 

with swallowing. He denies any fever, chills, nausea, vomiting, diarrhea, 

shortness of breath, cough, chest pain. No past medical history. No allergies to

medications. He states that his kids do have "cold-like" symptoms. He denies any

recent travel.





- Related Data


                                  Previous Rx's











 Medication  Instructions  Recorded  Last Taken  Type


 


DOXYCYCLINE Hyclate [Vibramycin] 100 mg PO Q12HR #28 capsule 10/20/17 Unknown Rx


 


Ibuprofen [Motrin] 600 mg PO Q8H PRN #30 tablet 10/20/17 Unknown Rx


 


Ondansetron [Zofran Odt] 4 mg PO Q8HR PRN #20 tab.rapdis 10/20/17 Unknown Rx


 


Ciprofloxacin HCl [Ciprofloxacin 500 mg PO Q12H 7 Days #14 tab 11/28/17 Unknown 

Rx





TAB]    


 


Amoxicillin/K Clav Tab [Augmentin 1 tab PO Q12HR #20 tab 04/04/18 Unknown Rx





875 mg]    


 


Cyclobenzaprine [Flexeril] 10 mg PO QHS PRN #7 tablet 04/04/18 Unknown Rx


 


Ibuprofen [Motrin] 600 mg PO Q8H PRN #30 tablet 04/04/18 Unknown Rx


 


Nystas/Diphen/Xyl Visc/Mylanta 30 ml MM Q4H PRN #300 ml 07/28/21 Unknown Rx





[Magic Mouthwash]    











                                    Allergies











Allergy/AdvReac Type Severity Reaction Status Date / Time


 


No Known Allergies Allergy   Verified 07/28/21 10:40














ED Dental HPI





- General


Chief complaint: Dental/Oral


Stated complaint: SORE THROAT


Time Seen by Provider: 07/28/21 11:12


Source: patient


Mode of arrival: Ambulatory


Limitations: No Limitations





- Related Data


                                  Previous Rx's











 Medication  Instructions  Recorded  Last Taken  Type


 


DOXYCYCLINE Hyclate [Vibramycin] 100 mg PO Q12HR #28 capsule 10/20/17 Unknown Rx


 


Ibuprofen [Motrin] 600 mg PO Q8H PRN #30 tablet 10/20/17 Unknown Rx


 


Ondansetron [Zofran Odt] 4 mg PO Q8HR PRN #20 tab.rapdis 10/20/17 Unknown Rx


 


Ciprofloxacin HCl [Ciprofloxacin 500 mg PO Q12H 7 Days #14 tab 11/28/17 Unknown 

Rx





TAB]    


 


Amoxicillin/K Clav Tab [Augmentin 1 tab PO Q12HR #20 tab 04/04/18 Unknown Rx





875 mg]    


 


Cyclobenzaprine [Flexeril] 10 mg PO QHS PRN #7 tablet 04/04/18 Unknown Rx


 


Ibuprofen [Motrin] 600 mg PO Q8H PRN #30 tablet 04/04/18 Unknown Rx


 


Nystas/Diphen/Xyl Visc/Mylanta 30 ml MM Q4H PRN #300 ml 07/28/21 Unknown Rx





[Magic Mouthwash]    











                                    Allergies











Allergy/AdvReac Type Severity Reaction Status Date / Time


 


No Known Allergies Allergy   Verified 07/28/21 10:40














ED Review of Systems


ROS: 


Stated complaint: SORE THROAT


Other details as noted in HPI





Comment: All other systems reviewed and negative





ED Past Medical Hx





- Past Medical History


Hx Psychiatric Treatment: Yes (depression, polysubstance abuse)





- Surgical History


Past Surgical History?: No





- Social History


Smoking Status: Never Smoker





- Medications


Home Medications: 


                                Home Medications











 Medication  Instructions  Recorded  Confirmed  Last Taken  Type


 


DOXYCYCLINE Hyclate [Vibramycin] 100 mg PO Q12HR #28 capsule 10/20/17 04/18/18 

Unknown Rx


 


Ibuprofen [Motrin] 600 mg PO Q8H PRN #30 tablet 10/20/17 04/18/18 Unknown Rx


 


Ondansetron [Zofran Odt] 4 mg PO Q8HR PRN #20 tab.rapdis 10/20/17 04/18/18 

Unknown Rx


 


Ciprofloxacin HCl [Ciprofloxacin 500 mg PO Q12H 7 Days #14 tab 11/28/17 04/18/18

 Unknown Rx





TAB]     


 


Amoxicillin/K Clav Tab [Augmentin 1 tab PO Q12HR #20 tab 04/04/18 04/18/18 

Unknown Rx





875 mg]     


 


Cyclobenzaprine [Flexeril] 10 mg PO QHS PRN #7 tablet 04/04/18 04/18/18 Unknown 

Rx


 


Ibuprofen [Motrin] 600 mg PO Q8H PRN #30 tablet 04/04/18 04/18/18 Unknown Rx


 


Nystas/Diphen/Xyl Visc/Mylanta 30 ml MM Q4H PRN #300 ml 07/28/21  Unknown Rx





[Magic Mouthwash]     














ED Physical Exam





- General


Limitations: No Limitations


General appearance: alert, in no apparent distress





- Head


Head exam: Present: atraumatic, normocephalic





- Eye


Eye exam: Present: normal appearance





- ENT


ENT exam: Present: normal orophraynx, mucous membranes moist





- Respiratory


Respiratory exam: Present: normal lung sounds bilaterally.  Absent: respiratory 

distress, wheezes, rales, rhonchi, stridor, chest wall tenderness, accessory 

muscle use, decreased breath sounds, prolonged expiratory





- Cardiovascular


Cardiovascular Exam: Present: normal rhythm, bradycardia, normal heart sounds.  

Absent: systolic murmur, diastolic murmur, rubs, gallop





- Neurological Exam


Neurological exam: Present: alert, oriented X3





- Psychiatric


Psychiatric exam: Present: normal affect, normal mood





- Skin


Skin exam: Present: warm, dry, intact.  Absent: rash





ED Course


                                   Vital Signs











  07/28/21 07/28/21





  10:44 12:32


 


Temperature 98.1 F 98.1 F


 


Pulse Rate 51 L 54 L


 


Respiratory 16 16





Rate  


 


Blood Pressure 123/75 


 


Blood Pressure  120/72





[Left]  


 


O2 Sat by Pulse 100 100





Oximetry  














ED Medical Decision Making





- Lab Data








                                   Vital Signs











  07/28/21 07/28/21





  10:44 12:32


 


Temperature 98.1 F 98.1 F


 


Pulse Rate 51 L 54 L


 


Respiratory 16 16





Rate  


 


Blood Pressure 123/75 


 


Blood Pressure  120/72





[Left]  


 


O2 Sat by Pulse 100 100





Oximetry  














- Medical Decision Making





Patient is a 25-year-old male presents emergency room complaints of a sore 

throat that began approximately 6 days ago. He states he has some discomfort 

with swallowing. He denies any fever, chills, nausea, vomiting, diarrhea, 

shortness of breath, cough, chest pain. No past medical history. No allergies to

 medications. He states that his kids do have "cold-like" symptoms. He denies 

any recent travel. VSS. on exam: Normal oropharynx, no tonsillar hypertrophy or 

exudates, uvula is midline, no uvular edema or deviation, no trismus, no tongue 

elevation, no muffled voice, no submandibular swelling.  Rapid strep is 

negative.  symptoms likely related to viral pharyngitis.  Patient given 

prescription for medication.  Advised patient Please use medication as 

prescribed.  Gargle with warm salt water 3 times a day.  May take Tylenol or 

ibuprofen as needed for pain.  Follow-up with your primary care doctor for 

reexamination.  Return to emergency room for any new or symptoms.


Critical care attestation.: 


If time is entered above; I have spent that time in minutes in the direct care 

of this critically ill patient, excluding procedure time.








ED Disposition


Clinical Impression: 


 Sore throat





Disposition: DC-01 TO HOME OR SELFCARE


Is pt being admited?: No


Does the pt Need Aspirin: No


Condition: Stable


Instructions:  Pharyngitis


Additional Instructions: 


Please use medication as prescribed.  Gargle with warm salt water 3 times a day.

  May take Tylenol or ibuprofen as needed for pain.  Follow-up with your primary

 care doctor for reexamination.  Return to emergency room for any new or 

symptoms.


Prescriptions: 


Nystas/Diphen/Xyl Visc/Mylanta [Magic Mouthwash] 30 ml MM Q4H PRN #300 ml


 PRN Reason: sore throat


Referrals: 


PRIMARY CARE,MD [Primary Care Provider] - 2-3 Days


Time of Disposition: 12:20


Print Language: ENGLISH

## 2021-11-30 ENCOUNTER — HOSPITAL ENCOUNTER (EMERGENCY)
Dept: HOSPITAL 5 - ED | Age: 26
Discharge: HOME | End: 2021-11-30
Payer: SELF-PAY

## 2021-11-30 VITALS — DIASTOLIC BLOOD PRESSURE: 76 MMHG | SYSTOLIC BLOOD PRESSURE: 139 MMHG

## 2021-11-30 DIAGNOSIS — R31.9: ICD-10-CM

## 2021-11-30 DIAGNOSIS — F32.9: ICD-10-CM

## 2021-11-30 DIAGNOSIS — R36.9: ICD-10-CM

## 2021-11-30 DIAGNOSIS — R10.9: Primary | ICD-10-CM

## 2021-11-30 LAB
ALBUMIN SERPL-MCNC: 5 G/DL (ref 3.9–5)
ALT SERPL-CCNC: 12 UNITS/L (ref 7–56)
BASOPHILS # (AUTO): 0 K/MM3 (ref 0–0.1)
BASOPHILS NFR BLD AUTO: 0.3 % (ref 0–1.8)
BUN SERPL-MCNC: 9 MG/DL (ref 9–20)
BUN/CREAT SERPL: 8 %
CALCIUM SERPL-MCNC: 9.5 MG/DL (ref 8.4–10.2)
EOSINOPHIL # BLD AUTO: 0 K/MM3 (ref 0–0.4)
EOSINOPHIL NFR BLD AUTO: 0.4 % (ref 0–4.3)
HCT VFR BLD CALC: 45.5 % (ref 35.5–45.6)
HEMOLYSIS INDEX: 6
HGB BLD-MCNC: 15 GM/DL (ref 11.8–15.2)
LYMPHOCYTES # BLD AUTO: 1.6 K/MM3 (ref 1.2–5.4)
LYMPHOCYTES NFR BLD AUTO: 19.8 % (ref 13.4–35)
MCHC RBC AUTO-ENTMCNC: 33 % (ref 32–34)
MCV RBC AUTO: 87 FL (ref 84–94)
MONOCYTES # (AUTO): 0.5 K/MM3 (ref 0–0.8)
MONOCYTES % (AUTO): 6 % (ref 0–7.3)
PLATELET # BLD: 311 K/MM3 (ref 140–440)
RBC # BLD AUTO: 5.25 M/MM3 (ref 3.65–5.03)
RBC #/AREA URNS HPF: > 182 /HPF (ref 0–6)
WBC #/AREA URNS HPF: 89 /HPF (ref 0–6)

## 2021-11-30 PROCEDURE — 36415 COLL VENOUS BLD VENIPUNCTURE: CPT

## 2021-11-30 PROCEDURE — 96372 THER/PROPH/DIAG INJ SC/IM: CPT

## 2021-11-30 PROCEDURE — 99284 EMERGENCY DEPT VISIT MOD MDM: CPT

## 2021-11-30 PROCEDURE — 80053 COMPREHEN METABOLIC PANEL: CPT

## 2021-11-30 PROCEDURE — 96374 THER/PROPH/DIAG INJ IV PUSH: CPT

## 2021-11-30 PROCEDURE — 96375 TX/PRO/DX INJ NEW DRUG ADDON: CPT

## 2021-11-30 PROCEDURE — 87086 URINE CULTURE/COLONY COUNT: CPT

## 2021-11-30 PROCEDURE — 74176 CT ABD & PELVIS W/O CONTRAST: CPT

## 2021-11-30 PROCEDURE — 96361 HYDRATE IV INFUSION ADD-ON: CPT

## 2021-11-30 PROCEDURE — 87076 CULTURE ANAEROBE IDENT EACH: CPT

## 2021-11-30 PROCEDURE — 87186 SC STD MICRODIL/AGAR DIL: CPT

## 2021-11-30 PROCEDURE — 81001 URINALYSIS AUTO W/SCOPE: CPT

## 2021-11-30 PROCEDURE — 85025 COMPLETE CBC W/AUTO DIFF WBC: CPT

## 2021-11-30 NOTE — CAT SCAN REPORT
CT abdomen pelvis wo con



INDICATION:

Hematuria severe.



COMPARISON: None



TECHNIQUE: Abdominal and pelvic CT exam performed. All CT scans at this location are performed using 
CT dose reduction for ALARA by means of automated exposure control.



FINDINGS:



CT ABDOMEN and PELVIS:

Lung Bases: No significant abnormality.

Liver: No significant abnormality.

Biliary: No significant abnormality.

Spleen: No significant abnormality.

Pancreas: No significant abnormality.

Adrenals: No significant abnormality.

Kidneys: No significant abnormality.

Bladder: Bladder is nondistended and not well evaluated on this study.

Lymphatics: No lymphadenopathy.

Vasculature: No significant abnormality. 

Bowel: No significant abnormality.  Normal appendix.

Pelvis: No significant abnormality.

Osseous Structures: No aggressive osseous lesion.

Additional Findings: None



IMPRESSION:

1. No stones. No significant abnormality of the abdomen or pelvis.



Signer Name: Franklin Mcclure MD 

Signed: 11/30/2021 5:53 PM

Workstation Name: [id number]

## 2021-11-30 NOTE — EMERGENCY DEPARTMENT REPORT
ED Male  HPI





- General


Chief complaint: Abdominal Pain


Stated complaint: penile discharge


Time Seen by Provider: 21 16:37


Source: patient


Mode of arrival: Ambulatory


Limitations: No Limitations





- History of Present Illness


Initial comments: 


The patient was evaluated in the emergency department for symptoms described in 

the history of present illness.  He/she was evaluated in the context of the 

global COVID-19 pandemic, which necessitated consideration that the patient 

might be at risk for infection with the virus that causes COVID-19.  Instit

utional protocols and algorithms that pertain to the evaluation of patients at 

risk for COVID-19 are in a state of rapid change based on information released 

by regulatory bodies including the CDC and federal and state organizations.  

These policies and algorithms were followed during the patient's care in the 

emergency department.  Please note that these policies, procedures and 

recommendations changed on a rapid basis.








25-year-old -American male comes in reporting left flank pain and gross 

hematuria since yesterday.  Patient denies any concern for STD.  Denies any 

penile discharge.  Denies any fever chills shortness of breath or abdominal 

pain.


Onset/Timin


-: days(s)


Location: left flank


Severity: severe


Quality: sharp


Consistency: constant


Improves with: none


Worsens with: urination


blood in urine





- Related Data


Sexually active: Yes


                                  Previous Rx's











 Medication  Instructions  Recorded  Last Taken  Type


 


Ibuprofen [Motrin] 600 mg PO Q8H PRN #30 tablet 10/20/17 Unknown Rx


 


Ondansetron [Zofran Odt] 4 mg PO Q8HR PRN #20 tab.rapdis 10/20/17 Unknown Rx


 


Ciprofloxacin HCl [Ciprofloxacin 500 mg PO Q12H 7 Days #14 tab 17 Unknown 

Rx





TAB]    


 


Amoxicillin/K Clav Tab [Augmentin 1 tab PO Q12HR #20 tab 18 Unknown Rx





875 mg]    


 


Cyclobenzaprine [Flexeril] 10 mg PO QHS PRN #7 tablet 18 Unknown Rx


 


Ibuprofen [Motrin] 600 mg PO Q8H PRN #30 tablet 18 Unknown Rx


 


Nystas/Diphen/Xyl Visc/Mylanta 30 ml MM Q4H PRN #300 ml 21 Unknown Rx





[Magic Mouthwash]    


 


DOXYCYCLINE Hyclate [Vibramycin 100 mg PO Q12HR #28 capsule 21 Unknown Rx





CAP]    











                                    Allergies











Allergy/AdvReac Type Severity Reaction Status Date / Time


 


No Known Allergies Allergy   Verified 21 10:40














ED Review of Systems


ROS: 


Stated complaint: penile discharge


Other details as noted in HPI





Comment: All other systems reviewed and negative





ED Past Medical Hx





- Past Medical History


Hx Psychiatric Treatment: Yes (depression, polysubstance abuse)





- Social History


Smoking Status: Never Smoker





- Medications


Home Medications: 


                                Home Medications











 Medication  Instructions  Recorded  Confirmed  Last Taken  Type


 


Ibuprofen [Motrin] 600 mg PO Q8H PRN #30 tablet 10/20/17 04/18/18 Unknown Rx


 


Ondansetron [Zofran Odt] 4 mg PO Q8HR PRN #20 tab.rapdis 10/20/17 04/18/18 

Unknown Rx


 


Ciprofloxacin HCl [Ciprofloxacin 500 mg PO Q12H 7 Days #14 tab 17

 Unknown Rx





TAB]     


 


Amoxicillin/K Clav Tab [Augmentin 1 tab PO Q12HR #20 tab 18 U

nknown Rx





875 mg]     


 


Cyclobenzaprine [Flexeril] 10 mg PO QHS PRN #7 tablet 18 Unknown 

Rx


 


Ibuprofen [Motrin] 600 mg PO Q8H PRN #30 tablet 18 Unknown Rx


 


Nystas/Diphen/Xyl Visc/Mylanta 30 ml MM Q4H PRN #300 ml 21  Unknown Rx





[Magic Mouthwash]     


 


DOXYCYCLINE Hyclate [Vibramycin 100 mg PO Q12HR #28 capsule 21  Unknown Rx





CAP]     














ED Physical Exam





- General


Limitations: No Limitations


General appearance: alert, in no apparent distress





- Head


Head exam: Present: atraumatic, normocephalic





- Eye


Eye exam: Present: normal appearance





- ENT


ENT exam: Present: mucous membranes moist





- Neck


Neck exam: Present: normal inspection





- Respiratory


Respiratory exam: Present: normal lung sounds bilaterally.  Absent: respiratory 

distress





- Cardiovascular


Cardiovascular Exam: Present: regular rate, normal rhythm.  Absent: systolic 

murmur, diastolic murmur, rubs, gallop





- GI/Abdominal


GI/Abdominal exam: Present: soft, normal bowel sounds





- Rectal


Rectal exam: Present: deferred





- 


 exam: Present: circumcision


External exam: Present: other (Penile discharge)





- Extremities Exam


Extremities exam: Present: normal inspection





- Back Exam


Back exam: Present: normal inspection





- Neurological Exam


Neurological exam: Present: alert, oriented X3, normal gait





- Psychiatric


Psychiatric exam: Present: normal affect, normal mood





- Skin


Skin exam: Present: warm, dry, intact, normal color.  Absent: rash





ED Course


                                   Vital Signs











  21





  16:26


 


Temperature 99.2 F


 


Pulse Rate 104 H


 


Respiratory 16





Rate 


 


Blood Pressure 129/90


 


O2 Sat by Pulse 99





Oximetry 














ED Medical Decision Making





- Lab Data


Result diagrams: 


                                 21 16:47





                                 21 16:47





- Radiology Data


Radiology results: report reviewed








Study Comments


 


 [facility]  


                                     [address] [place] [address]  


 


                                          Cat Scan Report   


                                               Signed  


 


Patient: POLY JARRELL                                                

               MR#:  


 [id number]          


: 1995                                                                

Acct:[id number]      


 


Age/Sex: 25 / M                                                                

ADM Date: 21     


 


Loc: ED       


Attending Dr:   


 


 


Ordering Physician: SURJIT WRIGHT  


Date of Service: 21  


Procedure(s): CT abdomen pelvis wo con  


Accession Number(s): [id number]  


 


cc: SURJIT WRIGHT   


 


 


CT abdomen pelvis wo con  


 


 INDICATION:  


 Hematuria severe.  


 


 COMPARISON: None  


 


 TECHNIQUE: Abdominal and pelvic CT exam performed. All CT scans at this 

location are performed 


using CT dose reduction for ALARA by means of automated exposure control.  


 


 FINDINGS:  


 


 CT ABDOMEN and PELVIS:  


 Lung Bases: No significant abnormality.  


 Liver: No significant abnormality.  


 Biliary: No significant abnormality.  


 Spleen: No significant abnormality.  


 Pancreas: No significant abnormality.  


 Adrenals: No significant abnormality.  


 Kidneys: No significant abnormality.  


 Bladder: Bladder is nondistended and not well evaluated on this study.  


 Lymphatics: No lymphadenopathy.  


 Vasculature: No significant abnormality.   


 Bowel: No significant abnormality.  Normal appendix.  


 Pelvis: No significant abnormality.  


 Osseous Structures: No aggressive osseous lesion.  


 Additional Findings: None  


 


 IMPRESSION:  


 1. No stones. No significant abnormality of the abdomen or pelvis.  


 


 Signer Name: Franklin Mcclure MD   


 Signed: 2021 5:53 PM  


 Workstation Name: [id number]   


 


 


Transcribed By: CS  


Dictated By: Franklin Mcclure MD  


Electronically Authenticated By: Franklin Mcclure MD    


Signed Date/Time: 21                                


 


 


 


DD/DT: 21                                                            

 


TD/TT:





- Medical Decision Making





25-year-old -American male comes in reporting left flank pain and gross 

hematuria since yesterday.  Patient denies any concern for STD.  Denies any 

penile discharge.  Denies any fever chills shortness of breath or abdominal 

pain.








INT, CT abdomen without contrast, urinalysis, normal saline IV, morphine 2 mg 

Zofran 4 mg IV.





CT scan comes back normal.  Patient be treated for STD Rocephin 2 g given IM 

discharged on doxycycline referral to [facility] for full 

STD evaluation.  Discussed refrain from intercourse until your partner or 

partners have been tested and treated.


Critical care attestation.: 


If time is entered above; I have spent that time in minutes in the direct care 

of this critically ill patient, excluding procedure time.








ED Disposition


Clinical Impression: 


 Hematuria, gross, Abnormal penile discharge, with blood, Concern about STD in 

male without diagnosis





Disposition:  HOME / SELF CARE / HOMELESS


Is pt being admited?: No


Does the pt Need Aspirin: No


Condition: Stable


Instructions:  Safe Sex, Hematuria, Adult, Urethritis, Adult


Additional Instructions: 


Referral to [facility] for full STD evaluation.  Discussed

refrain from intercourse until your partner or partners have been tested and 

treated.


Prescriptions: 


DOXYCYCLINE Hyclate [Vibramycin CAP] 100 mg PO Q12HR #28 capsule


Referrals: 


PRIMARY CARE,MD [Primary Care Provider] - 3-5 Days


[facility] [Outside] - 3-5 Days


[facility] [Outside] - 3-5 Days


[facility] [Outside] - 3-5 Days


Forms:  Accompanied Note, Work/School Release Form(ED)


Time of Disposition: 19:41

## 2021-11-30 NOTE — EMERGENCY DEPARTMENT REPORT
ED Male  HPI





- General


Chief complaint: Abdominal Pain


Stated complaint: penile discharge


Time Seen by Provider: 11/30/21 16:37


Source: patient


Mode of arrival: Ambulatory


Limitations: No Limitations





- Related Data


                                  Previous Rx's











 Medication  Instructions  Recorded  Last Taken  Type


 


DOXYCYCLINE Hyclate [Vibramycin] 100 mg PO Q12HR #28 capsule 10/20/17 Unknown Rx


 


Ibuprofen [Motrin] 600 mg PO Q8H PRN #30 tablet 10/20/17 Unknown Rx


 


Ondansetron [Zofran Odt] 4 mg PO Q8HR PRN #20 tab.rapdis 10/20/17 Unknown Rx


 


Ciprofloxacin HCl [Ciprofloxacin 500 mg PO Q12H 7 Days #14 tab 11/28/17 Unknown 

Rx





TAB]    


 


Amoxicillin/K Clav Tab [Augmentin 1 tab PO Q12HR #20 tab 04/04/18 Unknown Rx





875 mg]    


 


Cyclobenzaprine [Flexeril] 10 mg PO QHS PRN #7 tablet 04/04/18 Unknown Rx


 


Ibuprofen [Motrin] 600 mg PO Q8H PRN #30 tablet 04/04/18 Unknown Rx


 


Nystas/Diphen/Xyl Visc/Mylanta 30 ml MM Q4H PRN #300 ml 07/28/21 Unknown Rx





[Magic Mouthwash]    











                                    Allergies











Allergy/AdvReac Type Severity Reaction Status Date / Time


 


No Known Allergies Allergy   Verified 07/28/21 10:40














ED Review of Systems


ROS: 


Stated complaint: penile discharge


Other details as noted in HPI








ED Past Medical Hx





- Past Medical History


Hx Psychiatric Treatment: Yes (depression, polysubstance abuse)





- Social History


Smoking Status: Never Smoker





- Medications


Home Medications: 


                                Home Medications











 Medication  Instructions  Recorded  Confirmed  Last Taken  Type


 


DOXYCYCLINE Hyclate [Vibramycin] 100 mg PO Q12HR #28 capsule 10/20/17 04/18/18 

Unknown Rx


 


Ibuprofen [Motrin] 600 mg PO Q8H PRN #30 tablet 10/20/17 04/18/18 Unknown Rx


 


Ondansetron [Zofran Odt] 4 mg PO Q8HR PRN #20 tab.rapdis 10/20/17 04/18/18 

Unknown Rx


 


Ciprofloxacin HCl [Ciprofloxacin 500 mg PO Q12H 7 Days #14 tab 11/28/17 04/18/18

 Unknown Rx





TAB]     


 


Amoxicillin/K Clav Tab [Augmentin 1 tab PO Q12HR #20 tab 04/04/18 04/18/18 

Unknown Rx





875 mg]     


 


Cyclobenzaprine [Flexeril] 10 mg PO QHS PRN #7 tablet 04/04/18 04/18/18 Unknown 

Rx


 


Ibuprofen [Motrin] 600 mg PO Q8H PRN #30 tablet 04/04/18 04/18/18 Unknown Rx


 


Nystas/Diphen/Xyl Visc/Mylanta 30 ml MM Q4H PRN #300 ml 07/28/21  Unknown Rx





[Magic Mouthwash]     














ED Physical Exam





- General


Limitations: No Limitations





ED Course





                                   Vital Signs











  11/30/21





  16:26


 


Temperature 99.2 F


 


Pulse Rate 104 H


 


Respiratory 16





Rate 


 


Blood Pressure 129/90


 


O2 Sat by Pulse 99





Oximetry 














ED Medical Decision Making





- Lab Data


Result diagrams: 


                                 11/30/21 16:47





                                 11/30/21 16:47


Critical care attestation.: 


If time is entered above; I have spent that time in minutes in the direct care 

of this critically ill patient, excluding procedure time.








ED Disposition


Condition: Stable

## 2021-12-01 LAB — MUCOUS THREADS #/AREA URNS HPF: (no result) /HPF

## 2022-09-12 ENCOUNTER — HOSPITAL ENCOUNTER (EMERGENCY)
Dept: HOSPITAL 5 - ED | Age: 27
Discharge: HOME | End: 2022-09-12
Payer: SELF-PAY

## 2022-09-12 VITALS — DIASTOLIC BLOOD PRESSURE: 70 MMHG | SYSTOLIC BLOOD PRESSURE: 119 MMHG
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
## 2022-09-12 DIAGNOSIS — Z79.899: ICD-10-CM

## 2022-09-12 DIAGNOSIS — M25.572: Primary | ICD-10-CM
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
## 2022-09-12 DIAGNOSIS — F32.9: ICD-10-CM
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
## 2022-09-12 PROCEDURE — 99283 EMERGENCY DEPT VISIT LOW MDM: CPT

## 2022-09-12 NOTE — XRAY REPORT
LEFT ANKLE 3 VIEW(S)



INDICATION / CLINICAL INFORMATION: injury, pain



COMPARISON: None available.

 

FINDINGS:



BONES / JOINT(S): No acute fracture or subluxation. No significant arthritis.



SOFT TISSUES: No significant abnormality.



ADDITIONAL FINDINGS: None.







Signer Name: Mika Bullock MD 

Signed: 9/12/2022 2:43 PM

Workstation Name: [id number]

## 2022-09-12 NOTE — EMERGENCY DEPARTMENT REPORT
ED Lower Extremity HPI





- General


Chief Complaint: Extremity Injury, Lower


Stated Complaint: LEFT ANKLE/SWOLLEN


Time Seen by Provider: 09/12/22 14:15


Source: patient


Mode of arrival: Ambulatory


Limitations: No Limitations





- History of Present Illness


Initial Comments: 





26-year-old black male with no past medical history Zentz to the emergency 

department for evaluation of left ankle pain.  He states that he was playing 

basketball yesterday and twisted his left ankle.  He states that he has had pain

and swelling since then.


MD Complaint: ankle injury


-: This afternoon


Injury: Ankle: Left


Severity: severe


Severity scale (0 -10): 10


Context: running


Associated Symptoms: snap/pop sensation, swelling, able to partially bear 

weight.  denies: numbness, tingling





- Related Data


                                  Previous Rx's











 Medication  Instructions  Recorded  Last Taken  Type


 


Ibuprofen [Motrin] 600 mg PO Q8H PRN #30 tablet 10/20/17 Unknown Rx


 


Ondansetron [Zofran Odt] 4 mg PO Q8HR PRN #20 tab.rapdis 10/20/17 Unknown Rx


 


Ciprofloxacin HCl [Ciprofloxacin 500 mg PO Q12H 7 Days #14 tab 11/28/17 Unknown 

Rx





TAB]    


 


Amoxicillin/K Clav Tab [Augmentin 1 tab PO Q12HR #20 tab 04/04/18 Unknown Rx





875 mg]    


 


Cyclobenzaprine [Flexeril] 10 mg PO QHS PRN #7 tablet 04/04/18 Unknown Rx


 


Ibuprofen [Motrin] 600 mg PO Q8H PRN #30 tablet 04/04/18 Unknown Rx


 


Nystas/Diphen/Xyl Visc/Mylanta 30 ml MM Q4H PRN #300 ml 07/28/21 Unknown Rx





[Magic Mouthwash]    


 


DOXYCYCLINE Hyclate [Vibramycin 100 mg PO Q12HR #28 capsule 11/30/21 Unknown Rx





CAP]    


 


Naproxen 500 mg PO BID 7 Days #14 tab 09/12/22 Unknown Rx











                                    Allergies











Allergy/AdvReac Type Severity Reaction Status Date / Time


 


No Known Allergies Allergy   Verified 09/12/22 11:53














ED Review of Systems


ROS: 


Stated complaint: LEFT ANKLE/SWOLLEN


Other details as noted in HPI





Comment: All other systems reviewed and negative


Constitutional: denies: chills, fever


Respiratory: denies: shortness of breath


Cardiovascular: denies: chest pain, palpitations


Gastrointestinal: denies: abdominal pain, nausea, vomiting


Neurological: denies: headache, weakness





ED Past Medical Hx





- Past Medical History


Hx Psychiatric Treatment: Yes (depression, polysubstance abuse)





- Social History


Smoking Status: Never Smoker





- Medications


Home Medications: 


                                Home Medications











 Medication  Instructions  Recorded  Confirmed  Last Taken  Type


 


Ibuprofen [Motrin] 600 mg PO Q8H PRN #30 tablet 10/20/17 04/18/18 Unknown Rx


 


Ondansetron [Zofran Odt] 4 mg PO Q8HR PRN #20 tab.rapdis 10/20/17 04/18/18 

Unknown Rx


 


Ciprofloxacin HCl [Ciprofloxacin 500 mg PO Q12H 7 Days #14 tab 11/28/17 04/18/18

 Unknown Rx





TAB]     


 


Amoxicillin/K Clav Tab [Augmentin 1 tab PO Q12HR #20 tab 04/04/18 04/18/18 

Unknown Rx





875 mg]     


 


Cyclobenzaprine [Flexeril] 10 mg PO QHS PRN #7 tablet 04/04/18 04/18/18 Unknown 

Rx


 


Ibuprofen [Motrin] 600 mg PO Q8H PRN #30 tablet 04/04/18 04/18/18 Unknown Rx


 


Nystas/Diphen/Xyl Visc/Mylanta 30 ml MM Q4H PRN #300 ml 07/28/21  Unknown Rx





[Magic Mouthwash]     


 


DOXYCYCLINE Hyclate [Vibramycin 100 mg PO Q12HR #28 capsule 11/30/21  Unknown Rx





CAP]     


 


Naproxen 500 mg PO BID 7 Days #14 tab 09/12/22  Unknown Rx














ED Physical Exam





- General


Limitations: No Limitations


General appearance: alert, in no apparent distress





- Head


Head exam: Present: atraumatic, normocephalic





- Eye


Eye exam: Present: normal appearance.  Absent: conjunctival injection





- ENT


ENT exam: Present: normal exam





- Respiratory


Respiratory exam: Absent: respiratory distress





- Cardiovascular


Cardiovascular Exam: Present: bradycardia





- GI/Abdominal


GI/Abdominal exam: Absent: distended, tenderness





- Expanded Lower Extremity Exam


  ** Left


Lower Leg exam: Present: normal inspection


Ankle exam: Present: tenderness, swelling.  Absent: full ROM, abrasion, 

laceration, ecchymosis, deformity, crepidus, dislocation, erythema, anterior 

draw sign


Foot/Toe exam: Present: normal inspection


Neuro vascular tendon exam: Present: no vascular compromise.  Absent: pulse 

deficit, abnormal cap refill, sensory deficit, extremity cold to touch, pallor


Gait: Positive: observed and limited by pain





- Back Exam


Back exam: Present: normal inspection.  Absent: vertebral tenderness





- Neurological Exam


Neurological exam: Present: alert, oriented X3





- Psychiatric


Psychiatric exam: Present: normal affect, normal mood





- Skin


Skin exam: Present: warm, dry, intact, normal color





ED Course


                                   Vital Signs











  09/12/22





  11:49


 


Temperature 98.0 F


 


Pulse Rate 58 L


 


Respiratory 18





Rate 


 


Blood Pressure 119/70





[Right] 


 


O2 Sat by Pulse 99





Oximetry 














- Orthopedic Splinting/Casting


  ** Injury #1


Side: left


Lower Extremity Injury Location: ankle


Lower Extremity Immobilizer: stirrup splint (Preformed Velcro with Ace wrap)


Other Orthopedic Equipment: crutches





ED Lower Extremity MDM





- Radiology Data


Radiology results: report reviewed, image reviewed





Left ankle x-ray


 FINDINGS:  


 


 BONES / JOINT(S): No acute fracture or subluxation. No significant arthritis.  


 


 SOFT TISSUES: No significant abnormality.  


 


 ADDITIONAL FINDINGS: None.  





- Medical Decision Making








26-year-old black male with no past medical history Zentz to the emergency 

department for evaluation of left ankle pain.  He states that he was playing 

basketball yesterday and twisted his left ankle.  He states that he has had pain

and swelling since then.





Left ankle x-ray without any acute abnormalities noted.  Patient will be placed 

in ankle stirrup splint with Ace wrap and given crutches and discharged home 

with 7-day course of naproxen.  He is advised to follow-up with orthopedics if 

no improvement or worsening symptoms.  He is advised to return to the emergency 

department as needed.  He verbalizes understanding of and agreement with plan of

care.


Critical care attestation.: 


If time is entered above; I have spent that time in minutes in the direct care 

of this critically ill patient, excluding procedure time.








ED Disposition


Clinical Impression: 


Ankle pain, left


Qualifiers:


 Chronicity: acute Qualified Code(s): M25.572 - Pain in left ankle and joints of

left foot





Disposition: 01 HOME / SELF CARE / HOMELESS


Is pt being admited?: No


Does the pt Need Aspirin: No


Condition: Stable


Instructions:  How to Use Cold Therapy, Easy-to-Read, Musculoskeletal Pain, 

Ankle Pain


Additional Instructions: 


Take medications as prescribed.  Follow-up with orthopedics if no improvement or

worsening symptoms.  Return to the emergency department as needed.


Prescriptions: 


Naproxen 500 mg PO BID 7 Days #14 tab


Referrals: 


ANTONELLA RANDALL MD [Staff Physician] - 3-5 Days


Forms:  Work/School Release Form(ED)


Time of Disposition: 15:53

## 2022-09-21 ENCOUNTER — HOSPITAL ENCOUNTER (EMERGENCY)
Dept: HOSPITAL 5 - ED | Age: 27
Discharge: LEFT BEFORE DISCHARGE | End: 2022-09-21
Payer: SELF-PAY

## 2022-09-21 VITALS — SYSTOLIC BLOOD PRESSURE: 116 MMHG | DIASTOLIC BLOOD PRESSURE: 73 MMHG

## 2022-09-21 DIAGNOSIS — R05.9: ICD-10-CM

## 2022-09-21 DIAGNOSIS — Z53.21: ICD-10-CM

## 2022-09-21 DIAGNOSIS — R50.9: Primary | ICD-10-CM

## 2022-09-21 DIAGNOSIS — R09.89: ICD-10-CM
